# Patient Record
Sex: FEMALE | Race: WHITE
[De-identification: names, ages, dates, MRNs, and addresses within clinical notes are randomized per-mention and may not be internally consistent; named-entity substitution may affect disease eponyms.]

---

## 2018-03-30 ENCOUNTER — HOSPITAL ENCOUNTER (OUTPATIENT)
Dept: HOSPITAL 45 - C.RAD | Age: 38
Discharge: HOME | End: 2018-03-30
Attending: PHYSICIAN ASSISTANT
Payer: COMMERCIAL

## 2018-03-30 DIAGNOSIS — S60.211A: Primary | ICD-10-CM
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
## 2018-03-30 NOTE — DIAGNOSTIC IMAGING REPORT
R WRIST MIN 3 VIEWS ROUTINE, R HAND MIN 3 VIEWS ROUTINE



CLINICAL HISTORY: 38 years-old Female presenting with S60.211A Contusion of

right wrist, injury at the base of the thumb. 



TECHNIQUE: Frontal, lateral oblique, and lateral views of the right wrist as

well as frontal, oblique, and lateral views of the right hand were obtained. 



COMPARISON: None.



FINDINGS:

Right wrist:

No acute fracture or malalignment. No advanced degenerative change. No

radiographic soft tissue abnormality.



Right hand:

No acute fracture or malalignment. No advanced degenerative change. No

radiographic soft tissue abnormality.



IMPRESSION:

No acute osseous injury of the right wrist or right hand.







Electronically signed by:  River Kraft M.D.

3/30/2018 3:42 PM



Dictated Date/Time:  3/30/2018 3:40 PM